# Patient Record
Sex: FEMALE | Race: OTHER | HISPANIC OR LATINO | URBAN - METROPOLITAN AREA
[De-identification: names, ages, dates, MRNs, and addresses within clinical notes are randomized per-mention and may not be internally consistent; named-entity substitution may affect disease eponyms.]

---

## 2017-08-08 ENCOUNTER — OUTPATIENT (OUTPATIENT)
Dept: OUTPATIENT SERVICES | Facility: HOSPITAL | Age: 42
LOS: 1 days | Discharge: HOME | End: 2017-08-08

## 2017-08-08 DIAGNOSIS — G47.30 SLEEP APNEA, UNSPECIFIED: ICD-10-CM

## 2017-08-08 DIAGNOSIS — J32.1 CHRONIC FRONTAL SINUSITIS: ICD-10-CM

## 2017-08-08 DIAGNOSIS — J32.0 CHRONIC MAXILLARY SINUSITIS: ICD-10-CM

## 2017-08-08 DIAGNOSIS — N20.0 CALCULUS OF KIDNEY: ICD-10-CM

## 2017-08-08 DIAGNOSIS — E66.9 OBESITY, UNSPECIFIED: ICD-10-CM

## 2017-08-08 DIAGNOSIS — E11.9 TYPE 2 DIABETES MELLITUS WITHOUT COMPLICATIONS: ICD-10-CM

## 2017-08-08 DIAGNOSIS — R06.02 SHORTNESS OF BREATH: ICD-10-CM

## 2017-08-08 DIAGNOSIS — Z01.818 ENCOUNTER FOR OTHER PREPROCEDURAL EXAMINATION: ICD-10-CM

## 2017-08-08 DIAGNOSIS — F31.9 BIPOLAR DISORDER, UNSPECIFIED: ICD-10-CM

## 2017-08-08 DIAGNOSIS — J32.2 CHRONIC ETHMOIDAL SINUSITIS: ICD-10-CM

## 2017-08-08 DIAGNOSIS — I10 ESSENTIAL (PRIMARY) HYPERTENSION: ICD-10-CM

## 2018-05-09 PROBLEM — Z00.00 ENCOUNTER FOR PREVENTIVE HEALTH EXAMINATION: Status: ACTIVE | Noted: 2018-05-09

## 2018-06-15 ENCOUNTER — APPOINTMENT (OUTPATIENT)
Dept: PULMONOLOGY | Facility: CLINIC | Age: 43
End: 2018-06-15

## 2018-06-20 ENCOUNTER — APPOINTMENT (OUTPATIENT)
Dept: PODIATRY | Facility: CLINIC | Age: 43
End: 2018-06-20

## 2018-07-06 ENCOUNTER — OUTPATIENT (OUTPATIENT)
Dept: OUTPATIENT SERVICES | Facility: HOSPITAL | Age: 43
LOS: 1 days | Discharge: HOME | End: 2018-07-06

## 2018-07-06 DIAGNOSIS — M79.672 PAIN IN LEFT FOOT: ICD-10-CM

## 2019-10-04 ENCOUNTER — APPOINTMENT (OUTPATIENT)
Dept: ANTEPARTUM | Facility: CLINIC | Age: 44
End: 2019-10-04

## 2019-10-21 ENCOUNTER — OUTPATIENT (OUTPATIENT)
Dept: OUTPATIENT SERVICES | Facility: HOSPITAL | Age: 44
LOS: 1 days | Discharge: HOME | End: 2019-10-21

## 2019-10-21 ENCOUNTER — APPOINTMENT (OUTPATIENT)
Dept: OBGYN | Facility: CLINIC | Age: 44
End: 2019-10-21
Payer: MEDICAID

## 2019-10-21 VITALS
SYSTOLIC BLOOD PRESSURE: 142 MMHG | WEIGHT: 179 LBS | DIASTOLIC BLOOD PRESSURE: 82 MMHG | BODY MASS INDEX: 32.94 KG/M2 | HEIGHT: 62 IN

## 2019-10-21 DIAGNOSIS — N92.0 EXCESSIVE AND FREQUENT MENSTRUATION WITH REGULAR CYCLE: ICD-10-CM

## 2019-10-21 PROCEDURE — 99204 OFFICE O/P NEW MOD 45 MIN: CPT

## 2019-10-21 NOTE — PHYSICAL EXAM
[Awake] : awake [Alert] : alert [Soft] : soft [Labia Majora] : labia major [Labia Minora] : labia minora [Normal] : clitoris [No Bleeding] : there was no active vaginal bleeding [Anteversion] : anteverted [Acute Distress] : no acute distress [Tender] : non tender [Distended] : not distended

## 2019-10-24 DIAGNOSIS — N84.0 POLYP OF CORPUS UTERI: ICD-10-CM

## 2019-10-24 DIAGNOSIS — N92.0 EXCESSIVE AND FREQUENT MENSTRUATION WITH REGULAR CYCLE: ICD-10-CM

## 2019-11-05 ENCOUNTER — OUTPATIENT (OUTPATIENT)
Dept: OUTPATIENT SERVICES | Facility: HOSPITAL | Age: 44
LOS: 1 days | Discharge: HOME | End: 2019-11-05
Payer: MEDICAID

## 2019-11-05 VITALS
TEMPERATURE: 98 F | WEIGHT: 176.37 LBS | RESPIRATION RATE: 18 BRPM | HEIGHT: 62 IN | OXYGEN SATURATION: 99 % | DIASTOLIC BLOOD PRESSURE: 75 MMHG | SYSTOLIC BLOOD PRESSURE: 129 MMHG | HEART RATE: 68 BPM

## 2019-11-05 DIAGNOSIS — Z01.818 ENCOUNTER FOR OTHER PREPROCEDURAL EXAMINATION: ICD-10-CM

## 2019-11-05 DIAGNOSIS — Z98.890 OTHER SPECIFIED POSTPROCEDURAL STATES: Chronic | ICD-10-CM

## 2019-11-05 DIAGNOSIS — N84.0 POLYP OF CORPUS UTERI: ICD-10-CM

## 2019-11-05 DIAGNOSIS — N92.0 EXCESSIVE AND FREQUENT MENSTRUATION WITH REGULAR CYCLE: ICD-10-CM

## 2019-11-05 LAB
ALBUMIN SERPL ELPH-MCNC: 4.5 G/DL — SIGNIFICANT CHANGE UP (ref 3.5–5.2)
ALP SERPL-CCNC: 95 U/L — SIGNIFICANT CHANGE UP (ref 30–115)
ALT FLD-CCNC: 13 U/L — SIGNIFICANT CHANGE UP (ref 0–41)
ANION GAP SERPL CALC-SCNC: 15 MMOL/L — HIGH (ref 7–14)
APPEARANCE UR: CLEAR — SIGNIFICANT CHANGE UP
APTT BLD: 28.5 SEC — SIGNIFICANT CHANGE UP (ref 27–39.2)
AST SERPL-CCNC: 14 U/L — SIGNIFICANT CHANGE UP (ref 0–41)
BACTERIA # UR AUTO: NEGATIVE — SIGNIFICANT CHANGE UP
BASOPHILS # BLD AUTO: 0.07 K/UL — SIGNIFICANT CHANGE UP (ref 0–0.2)
BASOPHILS NFR BLD AUTO: 0.8 % — SIGNIFICANT CHANGE UP (ref 0–1)
BILIRUB SERPL-MCNC: 0.4 MG/DL — SIGNIFICANT CHANGE UP (ref 0.2–1.2)
BILIRUB UR-MCNC: NEGATIVE — SIGNIFICANT CHANGE UP
BUN SERPL-MCNC: 15 MG/DL — SIGNIFICANT CHANGE UP (ref 10–20)
CALCIUM SERPL-MCNC: 9.5 MG/DL — SIGNIFICANT CHANGE UP (ref 8.5–10.1)
CHLORIDE SERPL-SCNC: 105 MMOL/L — SIGNIFICANT CHANGE UP (ref 98–110)
CO2 SERPL-SCNC: 22 MMOL/L — SIGNIFICANT CHANGE UP (ref 17–32)
COLOR SPEC: YELLOW — SIGNIFICANT CHANGE UP
CREAT SERPL-MCNC: 0.7 MG/DL — SIGNIFICANT CHANGE UP (ref 0.7–1.5)
DIFF PNL FLD: NEGATIVE — SIGNIFICANT CHANGE UP
EOSINOPHIL # BLD AUTO: 0.69 K/UL — SIGNIFICANT CHANGE UP (ref 0–0.7)
EOSINOPHIL NFR BLD AUTO: 7.5 % — SIGNIFICANT CHANGE UP (ref 0–8)
EPI CELLS # UR: 1 /HPF — SIGNIFICANT CHANGE UP (ref 0–5)
ESTIMATED AVERAGE GLUCOSE: 131 MG/DL — HIGH (ref 68–114)
GLUCOSE SERPL-MCNC: 103 MG/DL — HIGH (ref 70–99)
GLUCOSE UR QL: SIGNIFICANT CHANGE UP
HBA1C BLD-MCNC: 6.2 % — HIGH (ref 4–5.6)
HCT VFR BLD CALC: 40 % — SIGNIFICANT CHANGE UP (ref 37–47)
HGB BLD-MCNC: 12.9 G/DL — SIGNIFICANT CHANGE UP (ref 12–16)
HYALINE CASTS # UR AUTO: 2 /LPF — SIGNIFICANT CHANGE UP (ref 0–7)
IMM GRANULOCYTES NFR BLD AUTO: 0.3 % — SIGNIFICANT CHANGE UP (ref 0.1–0.3)
INR BLD: 1.11 RATIO — SIGNIFICANT CHANGE UP (ref 0.65–1.3)
KETONES UR-MCNC: SIGNIFICANT CHANGE UP
LEUKOCYTE ESTERASE UR-ACNC: NEGATIVE — SIGNIFICANT CHANGE UP
LYMPHOCYTES # BLD AUTO: 2.33 K/UL — SIGNIFICANT CHANGE UP (ref 1.2–3.4)
LYMPHOCYTES # BLD AUTO: 25.3 % — SIGNIFICANT CHANGE UP (ref 20.5–51.1)
MCHC RBC-ENTMCNC: 27.2 PG — SIGNIFICANT CHANGE UP (ref 27–31)
MCHC RBC-ENTMCNC: 32.3 G/DL — SIGNIFICANT CHANGE UP (ref 32–37)
MCV RBC AUTO: 84.4 FL — SIGNIFICANT CHANGE UP (ref 81–99)
MONOCYTES # BLD AUTO: 0.55 K/UL — SIGNIFICANT CHANGE UP (ref 0.1–0.6)
MONOCYTES NFR BLD AUTO: 6 % — SIGNIFICANT CHANGE UP (ref 1.7–9.3)
NEUTROPHILS # BLD AUTO: 5.53 K/UL — SIGNIFICANT CHANGE UP (ref 1.4–6.5)
NEUTROPHILS NFR BLD AUTO: 60.1 % — SIGNIFICANT CHANGE UP (ref 42.2–75.2)
NITRITE UR-MCNC: NEGATIVE — SIGNIFICANT CHANGE UP
NRBC # BLD: 0 /100 WBCS — SIGNIFICANT CHANGE UP (ref 0–0)
PH UR: 7 — SIGNIFICANT CHANGE UP (ref 5–8)
PLATELET # BLD AUTO: 348 K/UL — SIGNIFICANT CHANGE UP (ref 130–400)
POTASSIUM SERPL-MCNC: 3.8 MMOL/L — SIGNIFICANT CHANGE UP (ref 3.5–5)
POTASSIUM SERPL-SCNC: 3.8 MMOL/L — SIGNIFICANT CHANGE UP (ref 3.5–5)
PROT SERPL-MCNC: 7.2 G/DL — SIGNIFICANT CHANGE UP (ref 6–8)
PROT UR-MCNC: ABNORMAL
PROTHROM AB SERPL-ACNC: 12.8 SEC — SIGNIFICANT CHANGE UP (ref 9.95–12.87)
RBC # BLD: 4.74 M/UL — SIGNIFICANT CHANGE UP (ref 4.2–5.4)
RBC # FLD: 13.2 % — SIGNIFICANT CHANGE UP (ref 11.5–14.5)
RBC CASTS # UR COMP ASSIST: 3 /HPF — SIGNIFICANT CHANGE UP (ref 0–4)
SODIUM SERPL-SCNC: 142 MMOL/L — SIGNIFICANT CHANGE UP (ref 135–146)
SP GR SPEC: 1.03 — HIGH (ref 1.01–1.02)
UROBILINOGEN FLD QL: ABNORMAL
WBC # BLD: 9.2 K/UL — SIGNIFICANT CHANGE UP (ref 4.8–10.8)
WBC # FLD AUTO: 9.2 K/UL — SIGNIFICANT CHANGE UP (ref 4.8–10.8)
WBC UR QL: 1 /HPF — SIGNIFICANT CHANGE UP (ref 0–5)

## 2019-11-05 PROCEDURE — 93010 ELECTROCARDIOGRAM REPORT: CPT

## 2019-11-05 NOTE — H&P PST ADULT - GASTROINTESTINAL
I spoke to mother on 10/11 and discussed her concerns  Agree w/ increasing pred to 20mg TID and holding off Lialda and Flagyl  Addendum 10/14 reviewed labs, ESR nl and CRP slt elevated  C  Diff +  Left VM on mother's phone to discuss treatment and f/u  Soft, non-tender, no hepatosplenomegaly, normal bowel sounds

## 2019-11-14 PROBLEM — F31.9 BIPOLAR DISORDER, UNSPECIFIED: Chronic | Status: ACTIVE | Noted: 2019-11-05

## 2019-11-14 PROBLEM — E11.9 TYPE 2 DIABETES MELLITUS WITHOUT COMPLICATIONS: Chronic | Status: ACTIVE | Noted: 2019-11-05

## 2019-11-14 PROBLEM — I10 ESSENTIAL (PRIMARY) HYPERTENSION: Chronic | Status: ACTIVE | Noted: 2019-11-05

## 2019-11-22 ENCOUNTER — RESULT REVIEW (OUTPATIENT)
Age: 44
End: 2019-11-22

## 2019-11-22 ENCOUNTER — OUTPATIENT (OUTPATIENT)
Dept: OUTPATIENT SERVICES | Facility: HOSPITAL | Age: 44
LOS: 1 days | Discharge: HOME | End: 2019-11-22
Payer: MEDICAID

## 2019-11-22 VITALS
DIASTOLIC BLOOD PRESSURE: 84 MMHG | HEIGHT: 62 IN | OXYGEN SATURATION: 100 % | TEMPERATURE: 98 F | SYSTOLIC BLOOD PRESSURE: 187 MMHG | HEART RATE: 72 BPM | WEIGHT: 176.37 LBS | RESPIRATION RATE: 16 BRPM

## 2019-11-22 VITALS
HEART RATE: 75 BPM | OXYGEN SATURATION: 98 % | DIASTOLIC BLOOD PRESSURE: 82 MMHG | RESPIRATION RATE: 18 BRPM | SYSTOLIC BLOOD PRESSURE: 161 MMHG

## 2019-11-22 DIAGNOSIS — Z98.890 OTHER SPECIFIED POSTPROCEDURAL STATES: Chronic | ICD-10-CM

## 2019-11-22 LAB
GLUCOSE BLDC GLUCOMTR-MCNC: 68 MG/DL — LOW (ref 70–99)
GLUCOSE BLDC GLUCOMTR-MCNC: 81 MG/DL — SIGNIFICANT CHANGE UP (ref 70–99)

## 2019-11-22 PROCEDURE — 58561 HYSTEROSCOPY REMOVE MYOMA: CPT

## 2019-11-22 PROCEDURE — 88305 TISSUE EXAM BY PATHOLOGIST: CPT | Mod: 26

## 2019-11-22 RX ORDER — ONDANSETRON 8 MG/1
4 TABLET, FILM COATED ORAL ONCE
Refills: 0 | Status: COMPLETED | OUTPATIENT
Start: 2019-11-22 | End: 2019-11-22

## 2019-11-22 RX ORDER — HYDROMORPHONE HYDROCHLORIDE 2 MG/ML
0.5 INJECTION INTRAMUSCULAR; INTRAVENOUS; SUBCUTANEOUS
Refills: 0 | Status: DISCONTINUED | OUTPATIENT
Start: 2019-11-22 | End: 2019-11-22

## 2019-11-22 RX ORDER — SODIUM CHLORIDE 9 MG/ML
1000 INJECTION, SOLUTION INTRAVENOUS
Refills: 0 | Status: DISCONTINUED | OUTPATIENT
Start: 2019-11-22 | End: 2019-12-18

## 2019-11-22 RX ORDER — OXYCODONE HYDROCHLORIDE 5 MG/1
5 TABLET ORAL ONCE
Refills: 0 | Status: DISCONTINUED | OUTPATIENT
Start: 2019-11-22 | End: 2019-11-22

## 2019-11-22 RX ORDER — KETOROLAC TROMETHAMINE 30 MG/ML
30 SYRINGE (ML) INJECTION ONCE
Refills: 0 | Status: DISCONTINUED | OUTPATIENT
Start: 2019-11-22 | End: 2019-11-22

## 2019-11-22 RX ADMIN — Medication 30 MILLIGRAM(S): at 13:45

## 2019-11-22 RX ADMIN — HYDROMORPHONE HYDROCHLORIDE 0.5 MILLIGRAM(S): 2 INJECTION INTRAMUSCULAR; INTRAVENOUS; SUBCUTANEOUS at 13:16

## 2019-11-22 RX ADMIN — HYDROMORPHONE HYDROCHLORIDE 0.5 MILLIGRAM(S): 2 INJECTION INTRAMUSCULAR; INTRAVENOUS; SUBCUTANEOUS at 13:30

## 2019-11-22 RX ADMIN — OXYCODONE HYDROCHLORIDE 5 MILLIGRAM(S): 5 TABLET ORAL at 14:19

## 2019-11-22 RX ADMIN — SODIUM CHLORIDE 100 MILLILITER(S): 9 INJECTION, SOLUTION INTRAVENOUS at 13:13

## 2019-11-22 RX ADMIN — ONDANSETRON 4 MILLIGRAM(S): 8 TABLET, FILM COATED ORAL at 14:12

## 2019-11-22 NOTE — BRIEF OPERATIVE NOTE - OPERATION/FINDINGS
Normal sized, anteverted uterus, no gross masses, normal contour. Procedure: DC/HYST/Myosure Polyectomy and Myomectomy. Findings: Normal sized, anteverted uterus sound to 3.5 cm. There was a 2cm polyp/fibroid in the anterior lower uterine segment/cervix that was removed with the myosure.  Hysteroscopic distending media: Saline. 200cc deficit. Procedure: DC/HYST/Myosure Polyectomy and Myomectomy. Findings: Normal sized, anteverted uterus sound to 3.5 inches. There was a 2cm polyp/fibroid in the anterior lower uterine segment/cervix that was removed with the myosure.  Hysteroscopic distending media: Saline. 200cc deficit.

## 2019-11-22 NOTE — CHART NOTE - NSCHARTNOTEFT_GEN_A_CORE
PACU ANESTHESIA ADMISSION NOTE      Procedure: D&C, hysteroscopy, myosure polypectomy  Post op diagnosis:  Uterine polyps    ____  Intubated  TV:______       Rate: ______      FiO2: ______    _x__  Patent Airway    _x___  Full return of protective reflexes    __x__  Full recovery from anesthesia / back to baseline     Vitals:   T:  98.2F         R:      12            BP:    139/68              Sat:    99               P: 78      Mental Status:  _x___ Awake   ___x__ Alert   _____ Drowsy   _____ Sedated    Nausea/Vomiting:  __x__ NO  ______Yes,   See Post - Op Orders          Pain Scale (0-10):  __0/10___    Treatment: ____ None    _x___ See Post - Op/PCA Orders    Post - Operative Fluids:   ____ Oral   _x___ See Post - Op Orders    Plan: Discharge:   __x__Home       _____Floor     _____Critical Care    _____  Other:_________________    Comments: pt tolerated procedure well, no anesthesia related complications. Care of pt endorsed to PACU, report given to PACU RN.

## 2019-11-22 NOTE — ASU DISCHARGE PLAN (ADULT/PEDIATRIC) - CARE PROVIDER_API CALL
Mitch Weathers)  Obstetrics and Gynecology  [address] [place] [address]  Phone: [phone]  Fax: [phone]  Follow Up Time:

## 2019-11-22 NOTE — BRIEF OPERATIVE NOTE - NSICDXBRIEFPROCEDURE_GEN_ALL_CORE_FT
PROCEDURES:  Hysteroscopy with dilation and curettage of uterus using MyoSure device 22-Nov-2019 14:18:28  Bridget Watts

## 2019-11-22 NOTE — ASU PREOP CHECKLIST - ASSESSMENT, HISTORY & PHYSICAL COMPLETED AND ON MEDICAL RECORD
Ap[facility]is [facility] Post Instructions  You may feel tired after your procedure today.   Please call your doctor if you have:  bleeding that doesn t stop, fever, pain where a needle or tube (catheter) was placed, seizures, trouble breathing, red urine, nausea or vomiting, other health concerns.     If your symptoms are severe, call 911.  If your veins were used, keep the bandages on for 2-4 hours.  Avoid heavy lifting with your arms.  If bleeding occurs from these sites, apply firm pressure for 5-10 minutes.  Call your physician if bleeding continues.    The Apheresis/Blood Donor Center is open Monday-Friday 7:30 a.m. to 5 p.m.  The phone number is [phone].  A Transfusion Medicine physician can be reached after 5:00 p.m. weekdays and on weekends /Holidays by calling [phone], and asking for the physician on call.      Photopheresis:  Avoid sunlight , and wear UVA-protective, full coverage sunglasses and sunscreen SPF 15 or higher  for 24 hours after your treatment.  The drug used in your treatment makes patients more sensitive to sunlight for about 24 hours after the treatment.     done

## 2019-11-26 LAB — SURGICAL PATHOLOGY STUDY: SIGNIFICANT CHANGE UP

## 2019-11-28 DIAGNOSIS — E11.9 TYPE 2 DIABETES MELLITUS WITHOUT COMPLICATIONS: ICD-10-CM

## 2019-11-28 DIAGNOSIS — F31.9 BIPOLAR DISORDER, UNSPECIFIED: ICD-10-CM

## 2019-11-28 DIAGNOSIS — I10 ESSENTIAL (PRIMARY) HYPERTENSION: ICD-10-CM

## 2019-11-28 DIAGNOSIS — N85.4 MALPOSITION OF UTERUS: ICD-10-CM

## 2019-11-28 DIAGNOSIS — N84.0 POLYP OF CORPUS UTERI: ICD-10-CM

## 2019-11-28 DIAGNOSIS — N92.0 EXCESSIVE AND FREQUENT MENSTRUATION WITH REGULAR CYCLE: ICD-10-CM

## 2019-11-28 DIAGNOSIS — Z79.84 LONG TERM (CURRENT) USE OF ORAL HYPOGLYCEMIC DRUGS: ICD-10-CM

## 2019-12-16 ENCOUNTER — APPOINTMENT (OUTPATIENT)
Dept: OBGYN | Facility: CLINIC | Age: 44
End: 2019-12-16
Payer: MEDICAID

## 2019-12-16 ENCOUNTER — OUTPATIENT (OUTPATIENT)
Dept: OUTPATIENT SERVICES | Facility: HOSPITAL | Age: 44
LOS: 1 days | Discharge: HOME | End: 2019-12-16

## 2019-12-16 VITALS
BODY MASS INDEX: 33.68 KG/M2 | DIASTOLIC BLOOD PRESSURE: 90 MMHG | WEIGHT: 183 LBS | HEIGHT: 62 IN | SYSTOLIC BLOOD PRESSURE: 170 MMHG

## 2019-12-16 DIAGNOSIS — N84.0 POLYP OF CORPUS UTERI: ICD-10-CM

## 2019-12-16 DIAGNOSIS — Z98.890 OTHER SPECIFIED POSTPROCEDURAL STATES: Chronic | ICD-10-CM

## 2019-12-16 DIAGNOSIS — Z30.9 ENCOUNTER FOR CONTRACEPTIVE MANAGEMENT, UNSPECIFIED: ICD-10-CM

## 2019-12-16 PROCEDURE — 99212 OFFICE O/P EST SF 10 MIN: CPT

## 2019-12-16 NOTE — PHYSICAL EXAM
[Awake] : awake [Alert] : alert [Acute Distress] : no acute distress [Soft] : soft [Tender] : non tender [Distended] : not distended [H/Smegaly] : no hepatosplenomegaly

## 2019-12-18 DIAGNOSIS — Z30.9 ENCOUNTER FOR CONTRACEPTIVE MANAGEMENT, UNSPECIFIED: ICD-10-CM

## 2020-03-25 ENCOUNTER — OUTPATIENT (OUTPATIENT)
Dept: OUTPATIENT SERVICES | Facility: HOSPITAL | Age: 45
LOS: 1 days | Discharge: HOME | End: 2020-03-25
Payer: MEDICAID

## 2020-03-25 DIAGNOSIS — M54.40 LUMBAGO WITH SCIATICA, UNSPECIFIED SIDE: ICD-10-CM

## 2020-03-25 DIAGNOSIS — Z98.890 OTHER SPECIFIED POSTPROCEDURAL STATES: Chronic | ICD-10-CM

## 2020-03-25 PROCEDURE — 72148 MRI LUMBAR SPINE W/O DYE: CPT | Mod: 26

## 2020-06-15 ENCOUNTER — APPOINTMENT (OUTPATIENT)
Dept: NEUROSURGERY | Facility: CLINIC | Age: 45
End: 2020-06-15
Payer: MEDICAID

## 2020-06-15 DIAGNOSIS — M54.12 RADICULOPATHY, CERVICAL REGION: ICD-10-CM

## 2020-06-15 PROCEDURE — 99204 OFFICE O/P NEW MOD 45 MIN: CPT

## 2020-06-15 NOTE — REASON FOR VISIT
[New Patient Visit] : a new patient visit [Referred By: _________] : Patient was referred by KENYATTA

## 2020-06-17 PROBLEM — M54.12 CERVICAL RADICULOPATHY: Status: ACTIVE | Noted: 2020-06-15

## 2020-06-17 NOTE — PLAN
[FreeTextEntry1] : I believe Ms. Conde axial neck pain is most likely muscular-related in nature with some radicular component. I am referring her to pain management for facet blocks. She will follow up with me in 6 weeks for reassessment.

## 2020-06-17 NOTE — HISTORY OF PRESENT ILLNESS
[> 3 months] : more  than 3 months [de-identified] : This is a 45 yrs old right-handed female who presents today for a consultation of neck pain radiating to her shoulders, and intermittently to the right arm for four years. It is associated with numbness and tingling in the posterior neck, and a sensation of right hand weakness. She has participated in physical therapy two years ago which provided temporary relief. Acupuncture did not alleviate her symptom. Ms. Conde received two injection four years ago which provided minor relief. She reports of limited range of motion due to the pain. Symptoms are aggravated with prolong standing, prolong sitting and driving. It is alleviated when laying down. \par \par MRI of the cervical spine without contrast done on 4/22/2020 showed multiple disc bulges, and DDD. [FreeTextEntry1] : neck pain radiating to right arm

## 2020-09-14 ENCOUNTER — APPOINTMENT (OUTPATIENT)
Dept: NEUROSURGERY | Facility: CLINIC | Age: 45
End: 2020-09-14

## 2020-09-21 ENCOUNTER — APPOINTMENT (OUTPATIENT)
Dept: NEUROSURGERY | Facility: CLINIC | Age: 45
End: 2020-09-21

## 2020-12-21 PROBLEM — Z30.9 ENCOUNTER FOR BIRTH CONTROL: Status: RESOLVED | Noted: 2019-12-16 | Resolved: 2020-12-21

## 2021-09-15 ENCOUNTER — APPOINTMENT (OUTPATIENT)
Dept: NEUROSURGERY | Facility: CLINIC | Age: 46
End: 2021-09-15

## 2021-10-22 DIAGNOSIS — Z82.49 FAMILY HISTORY OF ISCHEMIC HEART DISEASE AND OTHER DISEASES OF THE CIRCULATORY SYSTEM: ICD-10-CM

## 2021-10-22 RX ORDER — METFORMIN HYDROCHLORIDE 1000 MG/1
1000 TABLET, COATED ORAL
Refills: 0 | Status: ACTIVE | COMMUNITY

## 2021-10-22 RX ORDER — ENALAPRIL MALEATE 5 MG/1
TABLET ORAL
Refills: 0 | Status: ACTIVE | COMMUNITY

## 2021-10-22 RX ORDER — QUETIAPINE 25 MG/1
25 TABLET, FILM COATED ORAL
Refills: 0 | Status: ACTIVE | COMMUNITY

## 2021-10-27 ENCOUNTER — APPOINTMENT (OUTPATIENT)
Dept: NEUROSURGERY | Facility: CLINIC | Age: 46
End: 2021-10-27

## 2022-05-16 ENCOUNTER — APPOINTMENT (OUTPATIENT)
Dept: NEUROSURGERY | Facility: CLINIC | Age: 47
End: 2022-05-16

## 2023-01-19 ENCOUNTER — APPOINTMENT (OUTPATIENT)
Dept: ORTHOPEDIC SURGERY | Facility: CLINIC | Age: 48
End: 2023-01-19
Payer: MEDICAID

## 2023-01-19 ENCOUNTER — NON-APPOINTMENT (OUTPATIENT)
Age: 48
End: 2023-01-19

## 2023-01-19 VITALS — HEIGHT: 62 IN | WEIGHT: 192 LBS | BODY MASS INDEX: 35.33 KG/M2

## 2023-01-19 PROCEDURE — 73110 X-RAY EXAM OF WRIST: CPT | Mod: LT

## 2023-01-19 PROCEDURE — 73140 X-RAY EXAM OF FINGER(S): CPT | Mod: LT

## 2023-01-19 PROCEDURE — 99203 OFFICE O/P NEW LOW 30 MIN: CPT

## 2023-01-19 RX ORDER — QUETIAPINE 400 MG/1
TABLET, FILM COATED ORAL
Refills: 0 | Status: ACTIVE | COMMUNITY

## 2023-01-19 RX ORDER — MONTELUKAST 10 MG/1
TABLET, FILM COATED ORAL
Refills: 0 | Status: ACTIVE | COMMUNITY

## 2023-01-19 RX ORDER — DIVALPROEX SODIUM 500 MG/1
TABLET, DELAYED RELEASE ORAL
Refills: 0 | Status: ACTIVE | COMMUNITY

## 2023-01-19 RX ORDER — ENALAPRIL MALEATE 5 MG/1
TABLET ORAL
Refills: 0 | Status: ACTIVE | COMMUNITY

## 2023-01-19 NOTE — HISTORY OF PRESENT ILLNESS
[de-identified] : Patient is a 47-year-old female here for evaluation of left thumb and wrist pain.  Patient states that she lifts and moves objects a lot at work.  Patient states that she began feeling pain to the left thumb and wrist about 1 week ago with no injury/trauma.  Patient states since then the pain has worsened and caused her to go to the hospital on Monday.  Patient went to [facility] and x-rays were read negative for fracture.  Patient was placed in a thumb spica splint and told to follow-up with orthopedics for further evaluation.  Patient states she has history of carpal tunnel.  Patient states the pain has been slightly improving while in splint.

## 2023-01-19 NOTE — DATA REVIEWED
[FreeTextEntry1] : X-rays left thumb: No acute fractures, subluxations, dislocations.  Noted CMC arthritis. [FreeTextEntry2] : X-rays left wrist: No acute fractures, subluxations, dislocations

## 2023-01-19 NOTE — DISCUSSION/SUMMARY
[de-identified] : Discussed x-rays with patient showing osteoarthritis of CMC joint.  Patient has pain due to CMC arthritis along with de Quervain's tenosynovitis.  Discussed in detail with patient that these findings can be very painful and can last for a couple of months.  Discussed treatment options in detail including rest, Tylenol, range of motion exercises, warm water Epson salt soaks.  Placed patient in thumb spica brace.  Patient states that she cannot take anti-inflammatory medications.  Patient will take Tylenol and ice the hand and wrist.  Patient will call primary physician to discuss a possible Medrol Dosepak.  Patient states that she is unsure if she can take a Medrol Dosepak.  Patient will follow-up in 3 weeks for reevaluation, possible cortisone injection to the de Quervain's that is in [place].  Call office if symptoms worsen or change.  Patient understands agrees plan.  Seen on supervision of Dr. Taylor.

## 2023-01-19 NOTE — PHYSICAL EXAM
[Left] : left hand [First Dorsal Compartment] : first dorsal compartment [1st] : 1st [MCP Joint] : MCP joint [Proximal Phalanx] : proximal phalanx [CMC Joint] : CMC joint [3___] : pinch 3[unfilled]/5 [] : no instability w/varus/valgus or ant/post stressing of fingers joints [FreeTextEntry3] : Minimal swelling to wrist/proximal aspect of thumb [FreeTextEntry9] : Mild decreased range of motion of wrist/first MCP J/stiffness

## 2023-01-26 ENCOUNTER — FORM ENCOUNTER (OUTPATIENT)
Age: 48
End: 2023-01-26

## 2023-02-08 ENCOUNTER — APPOINTMENT (OUTPATIENT)
Dept: ORTHOPEDIC SURGERY | Facility: CLINIC | Age: 48
End: 2023-02-08
Payer: MEDICAID

## 2023-02-08 VITALS — BODY MASS INDEX: 35.33 KG/M2 | HEIGHT: 62 IN | WEIGHT: 192 LBS

## 2023-02-08 DIAGNOSIS — M65.4 RADIAL STYLOID TENOSYNOVITIS [DE QUERVAIN]: ICD-10-CM

## 2023-02-08 PROCEDURE — 99213 OFFICE O/P EST LOW 20 MIN: CPT

## 2023-02-08 NOTE — HISTORY OF PRESENT ILLNESS
[de-identified] : Patient is a 47 female here for repeat evaluation of her left wrist and thumb.  She started noticing pain about a month ago now.  She attributes it to lifting and moving heavy objects at work.  She was last seen in the office on 1/19/2023.  She takes Tylenol for pain as needed.  She tries not to take anti-inflammatories because of her diabetes.  She has been wearing a thumb spica brace.  She states that the pain is worse at night.  She is currently not working.

## 2023-02-08 NOTE — PHYSICAL EXAM
[de-identified] : Physical exam of her left wrist and thumb: Negative swelling.  Negative ecchymosis.  Nontender over the distal radius or distal ulna.  Negative anatomical snuffbox tenderness.  She has some tenderness over the radial styloid and first dorsal compartment.  She has significant pain over the CMC joint.  Positive CMC grind.  She cannot make a full for secondary to the pain.  No  active locking of any of the digits.  Sensory and motor are intact.

## 2023-02-08 NOTE — DISCUSSION/SUMMARY
[de-identified] : Today we discussed treatment options including ice, rest, thumb spica brace, anti-inflammatories, and a cortisone injection.\par She will continue to ice and rest the thumb.  She will continue to wear the thumb spica brace.\par She does not have any known kidney disease, however she does have a history of  diabetes that is controlled.  I recommend an anti-inflammatory short-term to help alleviate her symptoms.\par I wrote her a prescription for naproxen 500 mg twice daily for the inflammation.\par We discussed the risks and benefits of a cortisone injection of the CMC joint as I feel this is where her pain primarily is.  She would like to hold off on this today and prefers to wait until her follow-up appointment when her  can come with her.\par She is temporarily totally disabled and unable to return to work until her follow-up appointment with Dr. Espino or Dr. De Jesus in about a month.\par All questions were answered today.  She will contact the office with any questions or concerns.

## 2023-02-08 NOTE — WORK
[Repetitive Strain Injury] : repetitive strain injury [Was the competent medical cause of the injury] : was the competent medical cause of the injury [Are consistent with the injury] : are consistent with the injury [Consistent with my objective findings] : consistent with my objective findings [Total] : total [Does not reveal pre-existing condition(s) that may affect treatment/prognosis] : does not reveal pre-existing condition(s) that may affect treatment/prognosis [Cannot return to work because ________] : cannot return to work because [unfilled] [Patient] : patient [No Rx restrictions] : No Rx restrictions. [I provided the services listed above] :  I provided the services listed above. [FreeTextEntry1] : good [FreeTextEntry3] : JALEESA

## 2023-02-08 NOTE — DATA REVIEWED
[FreeTextEntry1] : X-rays reviewed from 1/19/2023 show CMC arthritis without any acute displaced fractures or bony abnormalities.

## 2023-04-10 ENCOUNTER — APPOINTMENT (OUTPATIENT)
Dept: ORTHOPEDIC SURGERY | Facility: CLINIC | Age: 48
End: 2023-04-10
Payer: OTHER MISCELLANEOUS

## 2023-04-10 VITALS — BODY MASS INDEX: 35.33 KG/M2 | WEIGHT: 192 LBS | HEIGHT: 62 IN

## 2023-04-10 DIAGNOSIS — M18.12 UNILATERAL PRIMARY OSTEOARTHRITIS OF FIRST CARPOMETACARPAL JOINT, LEFT HAND: ICD-10-CM

## 2023-04-10 PROCEDURE — 99214 OFFICE O/P EST MOD 30 MIN: CPT

## 2023-04-10 NOTE — PHYSICAL EXAM
[de-identified] : Patient does not have significant tenderness over the first dorsal compartment he does have pain with axial grind of the left thumb she has positive Tinel's meter compression test on the left side compared to the right side she has good range of motion to the fingers.

## 2023-04-10 NOTE — ASSESSMENT
[FreeTextEntry1] : Patient has left-sided basal joint arthritis as well as left-sided carpal tunnel syndrome.  She is contemplating left-sided carpal tunnel release surgery.  Going to be set up for her skin benefits of surgery, limited to bleeding infection risk injury and stiffness discussed with the patient postoperative course was discussed as well persistent numbness in the fingers as discussed persistent basal joint arthritis pain and increase in basal joint arthritis pain was discussed as well.  Opting for the surgery under sedation.

## 2023-04-10 NOTE — HISTORY OF PRESENT ILLNESS
[de-identified] : 48-year-old female with numbness and tingling in the left hand.  Also with pain and discomfort in the left thumb area.  Comes in today for evaluation she said she did have an EMG test on the left hand.  Told she had carpal tunnel she is wearing a brace.  Thumb bothers her but the thing that bothers her more is the numbness and tingling in the fingers

## 2023-04-13 ENCOUNTER — NON-APPOINTMENT (OUTPATIENT)
Age: 48
End: 2023-04-13

## 2023-04-21 ENCOUNTER — OUTPATIENT (OUTPATIENT)
Dept: OUTPATIENT SERVICES | Facility: HOSPITAL | Age: 48
LOS: 1 days | End: 2023-04-21
Payer: MEDICAID

## 2023-04-21 VITALS
SYSTOLIC BLOOD PRESSURE: 130 MMHG | DIASTOLIC BLOOD PRESSURE: 80 MMHG | OXYGEN SATURATION: 98 % | WEIGHT: 186.07 LBS | HEIGHT: 62 IN | HEART RATE: 76 BPM | RESPIRATION RATE: 16 BRPM | TEMPERATURE: 98 F

## 2023-04-21 DIAGNOSIS — Z01.818 ENCOUNTER FOR OTHER PREPROCEDURAL EXAMINATION: ICD-10-CM

## 2023-04-21 DIAGNOSIS — Z98.890 OTHER SPECIFIED POSTPROCEDURAL STATES: Chronic | ICD-10-CM

## 2023-04-21 DIAGNOSIS — G56.02 CARPAL TUNNEL SYNDROME, LEFT UPPER LIMB: ICD-10-CM

## 2023-04-21 LAB
A1C WITH ESTIMATED AVERAGE GLUCOSE RESULT: 6.4 % — HIGH (ref 4–5.6)
ALBUMIN SERPL ELPH-MCNC: 4.7 G/DL — SIGNIFICANT CHANGE UP (ref 3.5–5.2)
ALP SERPL-CCNC: 108 U/L — SIGNIFICANT CHANGE UP (ref 30–115)
ALT FLD-CCNC: 23 U/L — SIGNIFICANT CHANGE UP (ref 0–41)
ANION GAP SERPL CALC-SCNC: 12 MMOL/L — SIGNIFICANT CHANGE UP (ref 7–14)
AST SERPL-CCNC: 17 U/L — SIGNIFICANT CHANGE UP (ref 0–41)
BASOPHILS # BLD AUTO: 0.06 K/UL — SIGNIFICANT CHANGE UP (ref 0–0.2)
BASOPHILS NFR BLD AUTO: 0.6 % — SIGNIFICANT CHANGE UP (ref 0–1)
BILIRUB SERPL-MCNC: 0.4 MG/DL — SIGNIFICANT CHANGE UP (ref 0.2–1.2)
BUN SERPL-MCNC: 20 MG/DL — SIGNIFICANT CHANGE UP (ref 10–20)
CALCIUM SERPL-MCNC: 9.5 MG/DL — SIGNIFICANT CHANGE UP (ref 8.4–10.5)
CHLORIDE SERPL-SCNC: 103 MMOL/L — SIGNIFICANT CHANGE UP (ref 98–110)
CO2 SERPL-SCNC: 27 MMOL/L — SIGNIFICANT CHANGE UP (ref 17–32)
CREAT SERPL-MCNC: 0.7 MG/DL — SIGNIFICANT CHANGE UP (ref 0.7–1.5)
EGFR: 105 ML/MIN/1.73M2 — SIGNIFICANT CHANGE UP
EOSINOPHIL # BLD AUTO: 0.69 K/UL — SIGNIFICANT CHANGE UP (ref 0–0.7)
EOSINOPHIL NFR BLD AUTO: 7.3 % — SIGNIFICANT CHANGE UP (ref 0–8)
ESTIMATED AVERAGE GLUCOSE: 137 MG/DL — HIGH (ref 68–114)
GLUCOSE SERPL-MCNC: 65 MG/DL — LOW (ref 70–99)
HCT VFR BLD CALC: 44.3 % — SIGNIFICANT CHANGE UP (ref 37–47)
HGB BLD-MCNC: 13.8 G/DL — SIGNIFICANT CHANGE UP (ref 12–16)
IMM GRANULOCYTES NFR BLD AUTO: 0.2 % — SIGNIFICANT CHANGE UP (ref 0.1–0.3)
LYMPHOCYTES # BLD AUTO: 2.22 K/UL — SIGNIFICANT CHANGE UP (ref 1.2–3.4)
LYMPHOCYTES # BLD AUTO: 23.6 % — SIGNIFICANT CHANGE UP (ref 20.5–51.1)
MCHC RBC-ENTMCNC: 26.2 PG — LOW (ref 27–31)
MCHC RBC-ENTMCNC: 31.2 G/DL — LOW (ref 32–37)
MCV RBC AUTO: 84.2 FL — SIGNIFICANT CHANGE UP (ref 81–99)
MONOCYTES # BLD AUTO: 0.63 K/UL — HIGH (ref 0.1–0.6)
MONOCYTES NFR BLD AUTO: 6.7 % — SIGNIFICANT CHANGE UP (ref 1.7–9.3)
NEUTROPHILS # BLD AUTO: 5.8 K/UL — SIGNIFICANT CHANGE UP (ref 1.4–6.5)
NEUTROPHILS NFR BLD AUTO: 61.6 % — SIGNIFICANT CHANGE UP (ref 42.2–75.2)
NRBC # BLD: 0 /100 WBCS — SIGNIFICANT CHANGE UP (ref 0–0)
PLATELET # BLD AUTO: 335 K/UL — SIGNIFICANT CHANGE UP (ref 130–400)
PMV BLD: 10.5 FL — HIGH (ref 7.4–10.4)
POTASSIUM SERPL-MCNC: 4.8 MMOL/L — SIGNIFICANT CHANGE UP (ref 3.5–5)
POTASSIUM SERPL-SCNC: 4.8 MMOL/L — SIGNIFICANT CHANGE UP (ref 3.5–5)
PROT SERPL-MCNC: 7.2 G/DL — SIGNIFICANT CHANGE UP (ref 6–8)
RBC # BLD: 5.26 M/UL — SIGNIFICANT CHANGE UP (ref 4.2–5.4)
RBC # FLD: 14 % — SIGNIFICANT CHANGE UP (ref 11.5–14.5)
SODIUM SERPL-SCNC: 142 MMOL/L — SIGNIFICANT CHANGE UP (ref 135–146)
WBC # BLD: 9.42 K/UL — SIGNIFICANT CHANGE UP (ref 4.8–10.8)
WBC # FLD AUTO: 9.42 K/UL — SIGNIFICANT CHANGE UP (ref 4.8–10.8)

## 2023-04-21 PROCEDURE — 80053 COMPREHEN METABOLIC PANEL: CPT

## 2023-04-21 PROCEDURE — 99214 OFFICE O/P EST MOD 30 MIN: CPT | Mod: 25

## 2023-04-21 PROCEDURE — 83036 HEMOGLOBIN GLYCOSYLATED A1C: CPT

## 2023-04-21 PROCEDURE — 85025 COMPLETE CBC W/AUTO DIFF WBC: CPT

## 2023-04-21 PROCEDURE — 36415 COLL VENOUS BLD VENIPUNCTURE: CPT

## 2023-04-21 RX ORDER — GLIMEPIRIDE 1 MG
1 TABLET ORAL
Qty: 0 | Refills: 0 | DISCHARGE

## 2023-04-21 RX ORDER — SERTRALINE 25 MG/1
1 TABLET, FILM COATED ORAL
Refills: 0 | DISCHARGE

## 2023-04-21 RX ORDER — DIVALPROEX SODIUM 500 MG/1
2 TABLET, DELAYED RELEASE ORAL
Refills: 0 | DISCHARGE

## 2023-04-21 RX ORDER — ALOGLIPTIN AND METFORMIN HYDROCHLORIDE 12.5; 5 MG/1; MG/1
1 TABLET, FILM COATED ORAL
Refills: 0 | DISCHARGE

## 2023-04-21 RX ORDER — ALBUTEROL 90 UG/1
2 AEROSOL, METERED ORAL
Refills: 0 | DISCHARGE

## 2023-04-21 RX ORDER — METFORMIN HYDROCHLORIDE 850 MG/1
1 TABLET ORAL
Qty: 0 | Refills: 0 | DISCHARGE

## 2023-04-21 NOTE — H&P PST ADULT - HISTORY OF PRESENT ILLNESS
48YR old female states pain LT hand thumb and palm for about 1yr -progressively worsened -poor response to meds -difficulty with gripping and carrying wt - is now scheduled fo LEFT HAND OPEN CARPAL TUNNEL RELEASE. Denies COVID S/S. Recd 3 doses vaccine. Verbalized understanding of COVID prevention measures. Exercise giovanny 2 FOS.  Anesthesia Alert  NO--Difficult Airway  NO--History of neck surgery or radiation  NO--Limited ROM of neck  NO--History of Malignant hyperthermia  NO--Personal or family history of Pseudocholinesterase deficiency  NO--Prior Anesthesia Complication  YES--Latex Allergy  NO--Loose teeth  NO--History of Rheumatoid Arthritis  NO--REJI  No Bleeding risk  NO--Other_____

## 2023-04-21 NOTE — H&P PST ADULT - NSICDXPASTMEDICALHX_GEN_ALL_CORE_FT
PAST MEDICAL HISTORY:  Bipolar disorder no meds    DM (diabetes mellitus)     HTN (hypertension)     Obesity

## 2023-04-22 DIAGNOSIS — G56.02 CARPAL TUNNEL SYNDROME, LEFT UPPER LIMB: ICD-10-CM

## 2023-04-22 DIAGNOSIS — Z01.818 ENCOUNTER FOR OTHER PREPROCEDURAL EXAMINATION: ICD-10-CM

## 2023-04-27 ENCOUNTER — APPOINTMENT (OUTPATIENT)
Dept: ORTHOPEDIC SURGERY | Facility: CLINIC | Age: 48
End: 2023-04-27
Payer: OTHER MISCELLANEOUS

## 2023-04-27 ENCOUNTER — FORM ENCOUNTER (OUTPATIENT)
Age: 48
End: 2023-04-27

## 2023-04-27 PROBLEM — E66.9 OBESITY, UNSPECIFIED: Chronic | Status: ACTIVE | Noted: 2023-04-21

## 2023-04-27 PROCEDURE — 99214 OFFICE O/P EST MOD 30 MIN: CPT

## 2023-04-27 NOTE — ASSESSMENT
[FreeTextEntry1] : Patient has bilateral carpal tunnel syndrome believe it is work-related carpal tunnel syndrome secondary to the numbness and tingling that is present and repetitive activities that are present she currently is not working secondary to the pain discomfort and numbness is having is her hands.  And she has a mild partial temporary disability we are seeking Workmen's Compensation authorization for carpal tunnel release bilaterally.

## 2023-04-27 NOTE — PHYSICAL EXAM
[de-identified] : Patient has positive Tinel's and median nerve compression test bilaterally.  Patient has good thenar strength without thenar atrophy bilaterally.  Diminished sensation median nerve distribution bilaterally.  There is no swelling erythema ecchymoses or abrasions.

## 2023-04-27 NOTE — HISTORY OF PRESENT ILLNESS
[de-identified] : 48-year-old female with numbness and tingling in both hands comes in today for evaluation she has been working for the last 2 years at a [email or web address] locally.  Prior to this work she had no episodes of numbness and tingling in the fingers.  She has no specific hobbies.  She does repetitive activities during her workday.  She works 8 hours a day 5 days a week 6 to get half hour for lunch and a couple of 15-minute break is been 1 year plus that she has had symptoms of numbness and tingling in the fingers.  She has no history of thyroid disease did not have any issues with pregnancy and has no history of a wrist fracture.  She does have a history of diabetes.  She has not had any therapy she has tried bracing she did have an EMG test done in April of last year that showed carpal tunnel syndrome.

## 2023-05-09 ENCOUNTER — APPOINTMENT (OUTPATIENT)
Dept: ORTHOPEDIC SURGERY | Facility: CLINIC | Age: 48
End: 2023-05-09

## 2023-05-10 ENCOUNTER — TRANSCRIPTION ENCOUNTER (OUTPATIENT)
Age: 48
End: 2023-05-10

## 2023-05-10 ENCOUNTER — OUTPATIENT (OUTPATIENT)
Dept: INPATIENT UNIT | Facility: HOSPITAL | Age: 48
LOS: 1 days | Discharge: HOME | End: 2023-05-10
Payer: OTHER MISCELLANEOUS

## 2023-05-10 ENCOUNTER — APPOINTMENT (OUTPATIENT)
Dept: ORTHOPEDIC SURGERY | Facility: AMBULATORY SURGERY CENTER | Age: 48
End: 2023-05-10

## 2023-05-10 VITALS
HEIGHT: 62 IN | HEART RATE: 80 BPM | SYSTOLIC BLOOD PRESSURE: 165 MMHG | OXYGEN SATURATION: 100 % | DIASTOLIC BLOOD PRESSURE: 90 MMHG | TEMPERATURE: 98 F | WEIGHT: 188.94 LBS | RESPIRATION RATE: 18 BRPM

## 2023-05-10 VITALS
HEART RATE: 65 BPM | SYSTOLIC BLOOD PRESSURE: 134 MMHG | RESPIRATION RATE: 17 BRPM | DIASTOLIC BLOOD PRESSURE: 77 MMHG | OXYGEN SATURATION: 97 %

## 2023-05-10 DIAGNOSIS — Z98.890 OTHER SPECIFIED POSTPROCEDURAL STATES: Chronic | ICD-10-CM

## 2023-05-10 DIAGNOSIS — G56.02 CARPAL TUNNEL SYNDROME, LEFT UPPER LIMB: ICD-10-CM

## 2023-05-10 PROCEDURE — 64721 CARPAL TUNNEL SURGERY: CPT | Mod: LT

## 2023-05-10 RX ORDER — ONDANSETRON 8 MG/1
4 TABLET, FILM COATED ORAL ONCE
Refills: 0 | Status: DISCONTINUED | OUTPATIENT
Start: 2023-05-10 | End: 2023-05-10

## 2023-05-10 RX ORDER — IBUPROFEN 200 MG
1 TABLET ORAL
Qty: 20 | Refills: 0
Start: 2023-05-10

## 2023-05-10 RX ORDER — OXYCODONE HYDROCHLORIDE 5 MG/1
5 TABLET ORAL ONCE
Refills: 0 | Status: DISCONTINUED | OUTPATIENT
Start: 2023-05-10 | End: 2023-05-10

## 2023-05-10 RX ORDER — SODIUM CHLORIDE 9 MG/ML
1000 INJECTION, SOLUTION INTRAVENOUS
Refills: 0 | Status: DISCONTINUED | OUTPATIENT
Start: 2023-05-10 | End: 2023-05-10

## 2023-05-10 RX ORDER — HYDROMORPHONE HYDROCHLORIDE 2 MG/ML
0.5 INJECTION INTRAMUSCULAR; INTRAVENOUS; SUBCUTANEOUS
Refills: 0 | Status: DISCONTINUED | OUTPATIENT
Start: 2023-05-10 | End: 2023-05-10

## 2023-05-10 NOTE — PRE-ANESTHESIA EVALUATION ADULT - NSANTHBPHIGHRD_ENT_A_CORE
Central PA team  [phone]  Pool: HE PA MED ([id number])          PA has been initiated.       PA form completed and faxed insurance via Cover My Meds     Key:  [id number]     Medication:  Ozempic      Insurance:  [facility]         Response will be received via fax and may take up to 5-10 business days depending on plan                 No

## 2023-05-10 NOTE — ASU DISCHARGE PLAN (ADULT/PEDIATRIC) - NS MD DC FALL RISK RISK
For information on Fall & Injury Prevention, visit: [email or web address] OR  [email or web address] OR  https://www.cdc.gov/steadi/patient.html

## 2023-05-10 NOTE — CHART NOTE - NSCHARTNOTEFT_GEN_A_CORE
PACU ANESTHESIA ADMISSION NOTE      Procedure:   Post op diagnosis:      ____  Intubated  TV:______       Rate: ______      FiO2: ______    _x___  Patent Airway    __x__  Full return of protective reflexes    _x___  Full recovery from anesthesia / back to baseline     Vitals:   T: 98          R: 13                 BP: 112/76                 Sat: 99                  P: 90      Mental Status:  ___x_ Awake   __x___ Alert   _____ Drowsy   _____ Sedated    Nausea/Vomiting:  __x__ NO  ______Yes,   See Post - Op Orders          Pain Scale (0-10):  _____    Treatment: __x__ None    ____ See Post - Op/PCA Orders    Post - Operative Fluids:   ____ Oral   _x___ See Post - Op Orders    Plan: Discharge:   ____Home       __x___Floor     _____Critical Care    _____  Other:_________________    Comments:

## 2023-05-10 NOTE — ASU DISCHARGE PLAN (ADULT/PEDIATRIC) - ASU DC SPECIAL INSTRUCTIONSFT

## 2023-05-11 ENCOUNTER — FORM ENCOUNTER (OUTPATIENT)
Age: 48
End: 2023-05-11

## 2023-05-12 DIAGNOSIS — E11.9 TYPE 2 DIABETES MELLITUS WITHOUT COMPLICATIONS: ICD-10-CM

## 2023-05-12 DIAGNOSIS — I10 ESSENTIAL (PRIMARY) HYPERTENSION: ICD-10-CM

## 2023-05-12 DIAGNOSIS — G56.02 CARPAL TUNNEL SYNDROME, LEFT UPPER LIMB: ICD-10-CM

## 2023-05-12 DIAGNOSIS — Z91.040 LATEX ALLERGY STATUS: ICD-10-CM

## 2023-05-12 DIAGNOSIS — F31.9 BIPOLAR DISORDER, UNSPECIFIED: ICD-10-CM

## 2023-05-12 DIAGNOSIS — Z79.84 LONG TERM (CURRENT) USE OF ORAL HYPOGLYCEMIC DRUGS: ICD-10-CM

## 2023-05-12 DIAGNOSIS — E66.9 OBESITY, UNSPECIFIED: ICD-10-CM

## 2023-05-18 ENCOUNTER — APPOINTMENT (OUTPATIENT)
Dept: ORTHOPEDIC SURGERY | Facility: CLINIC | Age: 48
End: 2023-05-18
Payer: OTHER MISCELLANEOUS

## 2023-05-18 ENCOUNTER — NON-APPOINTMENT (OUTPATIENT)
Age: 48
End: 2023-05-18

## 2023-05-18 PROCEDURE — 99024 POSTOP FOLLOW-UP VISIT: CPT

## 2023-05-18 NOTE — HISTORY OF PRESENT ILLNESS
[de-identified] : 48-year-old female is here today for her first postop appointment.  She is status post left open carpal tunnel release 5/10/2023 with Dr. Espino.  She is doing well.  She states she still has some soreness in the palm and still feels some tingling.  She states she also feels some weakness in the hand and is not able to open things.

## 2023-05-18 NOTE — DISCUSSION/SUMMARY
[de-identified] : At this time the sutures were removed, patient tolerated this well.\par I discussed with her at this point she should continue working on her range of motion and scar massage. \par We discussed physical therapy which she would like to do.  We will submit for authorization for this from Worker's Comp.\par I will see her back in 6 weeks for repeat evaluation, until then she is going to remain out of work.\par Patient will call me if any other problems or concerns.  Patient verbalized understanding and agreed with the plan, all questions were answered in the office today.\par

## 2023-05-18 NOTE — PHYSICAL EXAM
[de-identified] : On examination of her left hand there is no swelling, resolving ecchymosis.  The incision is healing well, sutures are in place, no surrounding erythema or drainage from the wound.  She is able to open and close her fist, sensation is intact to all the fingers, 2+ radial pulse.\par \par

## 2023-06-05 ENCOUNTER — APPOINTMENT (OUTPATIENT)
Dept: ORTHOPEDIC SURGERY | Facility: CLINIC | Age: 48
End: 2023-06-05

## 2023-06-12 ENCOUNTER — APPOINTMENT (OUTPATIENT)
Dept: ORTHOPEDIC SURGERY | Facility: CLINIC | Age: 48
End: 2023-06-12
Payer: MEDICAID

## 2023-06-12 DIAGNOSIS — M75.32 CALCIFIC TENDINITIS OF LEFT SHOULDER: ICD-10-CM

## 2023-06-12 PROCEDURE — 99213 OFFICE O/P EST LOW 20 MIN: CPT

## 2023-06-12 PROCEDURE — 73030 X-RAY EXAM OF SHOULDER: CPT | Mod: LT

## 2023-06-12 NOTE — IMAGING
[de-identified] : On examination of the left shoulder no swelling, no ecchymosis, no erythema.  Skin is intact.  Tenderness to the outer deltoid.  No tenderness to the clavicle or AC joint.  Patient is able to forward flex, abduct, internally and externally rotate .  Negative drop arm, fairly good rotator cuff strength.  Positive Ryan, pain with [place]'s.  Good range of motion of the elbow and wrist.\par \par X-ray left shoulder no obvious fracture or dislocation, calcification is seen adjacent to the humeral head

## 2023-06-12 NOTE — HISTORY OF PRESENT ILLNESS
[de-identified] : 48-year-old female comes in today evaluation for left shoulder pain has been going on now for about 1 week.  She cannot recall any specific injury or trauma.  She is having pain with lifting.  Pain with range of motion.  Went to [facility] had an x-ray done.  She does not have the images with her today.\par History of hypertension, diabetes, asthma, bipolar.\par Had a carpal tunnel surgery done left wrist.\par Currently taking metformin enalapril glimepiride Depakote

## 2023-06-12 NOTE — ASSESSMENT
[FreeTextEntry1] : Recommending heat, anti-inflammatory.  Patient would like to take over-the-counter anti-inflammatory.  Physical therapy prescription was provided.  Kindly declined cortisone injection.  Follow-up in the office in 4 to 6 weeks for repeat evaluation.  If pain worsens or continues could consider further imaging with MRI.

## 2023-06-29 ENCOUNTER — NON-APPOINTMENT (OUTPATIENT)
Age: 48
End: 2023-06-29

## 2023-06-29 ENCOUNTER — APPOINTMENT (OUTPATIENT)
Dept: ORTHOPEDIC SURGERY | Facility: CLINIC | Age: 48
End: 2023-06-29
Payer: OTHER MISCELLANEOUS

## 2023-06-29 PROCEDURE — 99024 POSTOP FOLLOW-UP VISIT: CPT

## 2023-06-29 NOTE — PHYSICAL EXAM
[de-identified] : On examination of her left hand there is no swelling, no erythema, no ecchymosis.  Incision is well-healed.  She is able to open and close her fist, sensation is intact to all the fingers, 2+ radial pulse.\par \par

## 2023-06-29 NOTE — HISTORY OF PRESENT ILLNESS
[de-identified] : 48-year-old female is here today for her second postop appointment.  She is status post left open carpal tunnel release 5/10/2023 with Dr. Espino.  She states she still has soreness in her palm and feels tingling in her fingers.  She has been doing the physical therapy but states she still feels like she cannot hold anything heavy with that hand.

## 2023-06-29 NOTE — DISCUSSION/SUMMARY
[de-identified] : At this time she can continue with physical therapy, continue working on her range of motion and scar massage.\par We will see her back again in 4 to 6 weeks weeks for repeat evaluation, until then she is going to remain out of work.\par Patient will call me if any other problems or concerns.  Patient verbalized understanding and agreed with the plan, all questions were answered in the office today.\par

## 2023-07-09 ENCOUNTER — FORM ENCOUNTER (OUTPATIENT)
Age: 48
End: 2023-07-09

## 2023-07-20 ENCOUNTER — APPOINTMENT (OUTPATIENT)
Dept: ORTHOPEDIC SURGERY | Facility: CLINIC | Age: 48
End: 2023-07-20

## 2023-08-01 ENCOUNTER — APPOINTMENT (OUTPATIENT)
Dept: ORTHOPEDIC SURGERY | Facility: CLINIC | Age: 48
End: 2023-08-01
Payer: OTHER MISCELLANEOUS

## 2023-08-01 PROCEDURE — 99024 POSTOP FOLLOW-UP VISIT: CPT

## 2023-08-01 NOTE — ASSESSMENT
[FreeTextEntry1] : Patient had a left-sided surgery for carpal tunnel release.  She is doing well but does not have enough strength return back to work activities.  She will continue with a home exercise program and therapy program as well.  She is encouraged in use around the home.  See me in 6 weeks.  She currently is not working and is totally disabled in the acute postoperative timeframe

## 2023-08-01 NOTE — PHYSICAL EXAM
[de-identified] : Patient is a well-healed surgical skin incision.  Patient has some tenderness around the incision.  Patient has limited grasp.  Patient has normal capillary refill.

## 2023-08-01 NOTE — HISTORY OF PRESENT ILLNESS
[de-identified] : 48-year-old female status post left carpal tunnel release.  Comes in today for evaluation.  She currently is not back at work yet.  She does not feel strong enough to be able to return back to her work activities.

## 2023-09-12 ENCOUNTER — APPOINTMENT (OUTPATIENT)
Dept: ORTHOPEDIC SURGERY | Facility: CLINIC | Age: 48
End: 2023-09-12

## 2023-09-21 ENCOUNTER — APPOINTMENT (OUTPATIENT)
Dept: RADIOLOGY | Facility: CLINIC | Age: 48
End: 2023-09-21
Payer: MEDICAID

## 2023-09-21 ENCOUNTER — APPOINTMENT (OUTPATIENT)
Dept: ORTHOPEDIC SURGERY | Facility: CLINIC | Age: 48
End: 2023-09-21
Payer: OTHER MISCELLANEOUS

## 2023-09-21 ENCOUNTER — APPOINTMENT (OUTPATIENT)
Dept: PAIN MANAGEMENT | Facility: CLINIC | Age: 48
End: 2023-09-21
Payer: MEDICAID

## 2023-09-21 VITALS — BODY MASS INDEX: 35.33 KG/M2 | WEIGHT: 192 LBS | HEIGHT: 62 IN

## 2023-09-21 DIAGNOSIS — M54.81 OCCIPITAL NEURALGIA: ICD-10-CM

## 2023-09-21 DIAGNOSIS — S16.1XXA STRAIN OF MUSCLE, FASCIA AND TENDON AT NECK LEVEL, INITIAL ENCOUNTER: ICD-10-CM

## 2023-09-21 PROCEDURE — 99204 OFFICE O/P NEW MOD 45 MIN: CPT

## 2023-09-21 PROCEDURE — 72040 X-RAY EXAM NECK SPINE 2-3 VW: CPT

## 2023-09-21 PROCEDURE — 99213 OFFICE O/P EST LOW 20 MIN: CPT

## 2023-10-18 ENCOUNTER — RX RENEWAL (OUTPATIENT)
Age: 48
End: 2023-10-18

## 2023-11-10 ENCOUNTER — NON-APPOINTMENT (OUTPATIENT)
Age: 48
End: 2023-11-10

## 2023-11-10 ENCOUNTER — APPOINTMENT (OUTPATIENT)
Dept: ORTHOPEDIC SURGERY | Facility: CLINIC | Age: 48
End: 2023-11-10
Payer: OTHER MISCELLANEOUS

## 2023-11-10 PROCEDURE — 99213 OFFICE O/P EST LOW 20 MIN: CPT

## 2023-11-16 ENCOUNTER — APPOINTMENT (OUTPATIENT)
Dept: PAIN MANAGEMENT | Facility: CLINIC | Age: 48
End: 2023-11-16

## 2023-11-16 RX ORDER — METHOCARBAMOL 750 MG/1
750 TABLET, FILM COATED ORAL TWICE DAILY
Qty: 60 | Refills: 0 | Status: ACTIVE | COMMUNITY
Start: 2023-09-21 | End: 1900-01-01

## 2023-11-16 RX ORDER — NAPROXEN 500 MG/1
500 TABLET ORAL
Qty: 60 | Refills: 0 | Status: ACTIVE | COMMUNITY
Start: 2023-09-21 | End: 1900-01-01

## 2023-12-05 ENCOUNTER — APPOINTMENT (OUTPATIENT)
Dept: ORTHOPEDIC SURGERY | Facility: CLINIC | Age: 48
End: 2023-12-05
Payer: OTHER MISCELLANEOUS

## 2023-12-05 PROCEDURE — 99075 MEDICAL TESTIMONY: CPT

## 2024-01-22 ENCOUNTER — APPOINTMENT (OUTPATIENT)
Dept: ORTHOPEDIC SURGERY | Facility: CLINIC | Age: 49
End: 2024-01-22
Payer: OTHER MISCELLANEOUS

## 2024-01-22 VITALS — WEIGHT: 192 LBS | BODY MASS INDEX: 35.33 KG/M2 | HEIGHT: 62 IN

## 2024-01-22 DIAGNOSIS — G56.02 CARPAL TUNNEL SYNDROME, LEFT UPPER LIMB: ICD-10-CM

## 2024-01-22 DIAGNOSIS — G56.01 CARPAL TUNNEL SYNDROME, RIGHT UPPER LIMB: ICD-10-CM

## 2024-01-22 PROCEDURE — 99243 OFF/OP CNSLTJ NEW/EST LOW 30: CPT

## 2024-01-22 NOTE — PHYSICAL EXAM
[de-identified] : Patient has well-healed surgical skin incision across the left wrist.  She has positive Tinel's medial compression test on the right side.  Good thenar strength of both hands.  There is positive axial grind on the left thumb compared to the right thumb.

## 2024-01-22 NOTE — ASSESSMENT
[FreeTextEntry1] : Patient surgical intervention for left carpal tunnel release.  She still has some residual pain discomfort and difficulty using the hand especially with pinch.  She has pain along the basal joint.  She has an increase in numbness and tingling with activities.  On the right side she did not have surgical intervention but also has numbness and tingling the fingers but does not warrant a surgical intervention at this time.  This may require surgery in the future.  For surgery and carpal tunnel release on the left side with residual symptoms she has a 20% permanent loss use of the left hand and on the right side did not have surgery is not symptomatic enough to warrant surgery she has a 10% loss of use of the right hand

## 2024-01-22 NOTE — HISTORY OF PRESENT ILLNESS
Hospitalist [de-identified] : 48-year-old female status post left carpal tunnel release surgery..  She never regained function never had diminished pain and enough discomfort to be able to return back to work activities she comes in today for evaluation she does have a Workmen's Compensation case for both hands.  She still reporting numbness and tingling on the right hand.  At this time to warrant a surgical intervention.  She does not have much in the way of numbness that wakes her up and sleep but occasionally it can bother her with an increase in standard activities of daily living or she does have weakness with grasping things and pinching things on the left side

## 2024-01-31 ENCOUNTER — APPOINTMENT (OUTPATIENT)
Dept: ORTHOPEDIC SURGERY | Facility: CLINIC | Age: 49
End: 2024-01-31
Payer: MEDICAID

## 2024-01-31 VITALS — BODY MASS INDEX: 34.96 KG/M2 | HEIGHT: 62 IN | WEIGHT: 190 LBS

## 2024-01-31 DIAGNOSIS — M25.561 PAIN IN RIGHT KNEE: ICD-10-CM

## 2024-01-31 PROCEDURE — 99213 OFFICE O/P EST LOW 20 MIN: CPT | Mod: 25

## 2024-01-31 PROCEDURE — 73562 X-RAY EXAM OF KNEE 3: CPT | Mod: RT

## 2024-01-31 RX ORDER — MELOXICAM 15 MG/1
15 TABLET ORAL
Qty: 30 | Refills: 0 | Status: ACTIVE | COMMUNITY
Start: 2024-01-31 | End: 1900-01-01

## 2024-01-31 NOTE — DISCUSSION/SUMMARY
[de-identified] : Patient has right knee pain.  At this time, I offered patient cortisone injection and physical therapy, both of which she kindly declined.  I gave patient the AAOS knee exercises to do as tolerated.  Also prescribing meloxicam 15 mg to take as needed for pain and inflammation.  Pt was educated about risks and benefits of anti-inflammatories.  Anti-inflammatories can irritate the stomach lining, so if there are any symptoms of acid reflux or heartburn the patient was instructed to stop taking the medication. To help prevent this, it is best to take with a meal. They cannot be taken if the pt is on blood thinners and if the patient is at risk of high blood pressure, blood pressure should be monitored daily while taking the medication.  Patient was encouraged to apply heat to the area modify activity based on pain.  Will follow-up in approximately 4 to 6 weeks and if still having pain at that time an MRI may be warranted.  Patient agrees to above plan all questions were answered today

## 2024-01-31 NOTE — DATA REVIEWED
[FreeTextEntry1] : X-ray images were obtained at the office today.  AP, lateral, oblique views of the right knee reveal mild degenerative changes patellofemoral only and medially.  No acute fractures or dislocations noted.

## 2024-01-31 NOTE — HISTORY OF PRESENT ILLNESS
[de-identified] : 48-year-old female presents for right knee pain.  This pain started yesterday no inciting event or injury.  Patient has pain with weightbearing on the knee and going up and down the stairs.  Patient also has pain with prolonged driving.  Patient has been taking Motrin for pain relief.

## 2024-01-31 NOTE — PHYSICAL EXAM
[de-identified] : Physical exam of right knee: -No erythema, edema, ecchymosis present.  Skin intact -no TTP of knee joint -Calf is soft and nontender -Negative Denise's, negative anterior drawer -Full ROM, pain with full extension and full flexion -+2 posterior tibialis pulse -Sensation intact to light touch

## 2024-03-12 ENCOUNTER — APPOINTMENT (OUTPATIENT)
Dept: ORTHOPEDIC SURGERY | Facility: CLINIC | Age: 49
End: 2024-03-12

## 2024-09-26 ENCOUNTER — EMERGENCY (EMERGENCY)
Facility: HOSPITAL | Age: 49
LOS: 0 days | Discharge: HOME | End: 2024-09-26
Attending: EMERGENCY MEDICINE

## 2024-09-26 VITALS
HEART RATE: 75 BPM | RESPIRATION RATE: 18 BRPM | TEMPERATURE: 100 F | DIASTOLIC BLOOD PRESSURE: 78 MMHG | OXYGEN SATURATION: 100 % | SYSTOLIC BLOOD PRESSURE: 151 MMHG

## 2024-09-26 DIAGNOSIS — Z98.890 OTHER SPECIFIED POSTPROCEDURAL STATES: Chronic | ICD-10-CM

## 2024-09-26 PROCEDURE — 99283 EMERGENCY DEPT VISIT LOW MDM: CPT

## 2024-09-26 PROCEDURE — 99282 EMERGENCY DEPT VISIT SF MDM: CPT

## 2024-09-26 NOTE — ED PROVIDER NOTE - ATTENDING SHARED VISIT SELECTOR YES
Hi.  Curious if my sleep troubles can cause high cholesterol. I did have anxiety and was up between 2-3:30 am this morning -[place]   Yes

## 2024-09-26 NOTE — ED PROVIDER NOTE - CLINICAL SUMMARY MEDICAL DECISION MAKING FREE TEXT BOX
Pt with JOSE dai since yesterday. 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 VS/exam as noted, Work note provided. Pt will f/u PMD as needed.

## 2024-09-26 NOTE — ED PROVIDER NOTE - OBJECTIVE STATEMENT
48 yo F presenting for evaluation of uri symptoms - congestion and sore throat that started yesterday. No cp, sob, fever, chills, abdominal pain, nausea, vomiting, diarrhea, back pain, urinary symptoms, headache, dizziness, paresthesias, or weakness.

## 2024-09-26 NOTE — ED PROVIDER NOTE - PATIENT PORTAL LINK FT
You can access the FollowMyHealth Patient Portal offered by [facility] by registering at the following website: [email or web address]. By joining [facility]’s FollowMyHealth portal, you will also be able to view your health information using other applications (apps) compatible with our system.

## 2024-12-16 NOTE — ASU PREOP CHECKLIST - LATEX ALLERGY
[FreeTextEntry1] : This is a 39-year-old woman with a long history of headache that has evolved into a more chronic daily pattern. Likely this represents chronic migraine with a component of analgesic overuse. Brain imaging was unremarkable.  I have suggested a trial of Aimovig injections for prophylaxis.  I will see her back in 6 months.
no